# Patient Record
Sex: MALE | Race: WHITE
[De-identification: names, ages, dates, MRNs, and addresses within clinical notes are randomized per-mention and may not be internally consistent; named-entity substitution may affect disease eponyms.]

---

## 2019-08-05 ENCOUNTER — HOSPITAL ENCOUNTER (EMERGENCY)
Dept: HOSPITAL 47 - EC | Age: 24
Discharge: HOME | End: 2019-08-05
Payer: COMMERCIAL

## 2019-08-05 VITALS
TEMPERATURE: 98.4 F | SYSTOLIC BLOOD PRESSURE: 123 MMHG | DIASTOLIC BLOOD PRESSURE: 83 MMHG | RESPIRATION RATE: 16 BRPM | HEART RATE: 84 BPM

## 2019-08-05 DIAGNOSIS — Z88.0: ICD-10-CM

## 2019-08-05 DIAGNOSIS — S90.811A: Primary | ICD-10-CM
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
## 2019-08-05 DIAGNOSIS — Z23: ICD-10-CM

## 2019-08-05 PROCEDURE — 99283 EMERGENCY DEPT VISIT LOW MDM: CPT

## 2019-08-05 PROCEDURE — 90715 TDAP VACCINE 7 YRS/> IM: CPT

## 2019-08-05 PROCEDURE — 90471 IMMUNIZATION ADMIN: CPT

## 2019-08-05 NOTE — ED
General Adult HPI





- General


Chief complaint: Wound/Laceration


Stated complaint: Stepped on nail


Time Seen by Provider: 08/05/19 14:03


Source: patient


Mode of arrival: ambulatory


Limitations: no limitations





- History of Present Illness


Initial comments: 





Patient is a 24-year-old male presenting to emergency Department with chief 

complaint of wanting to get a tetanus shot.  Patient reports he had a minor 

abrasion on the lateral aspect of his right foot with a resting male.  Patient 

is unaware of his tetanus status.  Patient denies any puncture wounds and 

reports the patient is only superficial.  Patient states the needle did not go 

through his shoe.  Patient denies any pain, active bleeding.  Patient is not on 

blood thinners.





- Related Data


                                    Allergies











Allergy/AdvReac Type Severity Reaction Status Date / Time


 


amoxicillin Allergy  Rash/Hives Verified 08/05/19 13:39














Review of Systems


ROS Statement: 


Those systems with pertinent positive or pertinent negative responses have been 

documented in the HPI.





ROS Other: All systems not noted in ROS Statement are negative.





Past Medical History


Additional Past Medical History / Comment(s): spherocytosis


History of Any Multi-Drug Resistant Organisms: None Reported


Additional Past Surgical History / Comment(s): spleenectomy


Past Psychological History: No Psychological Hx Reported


Smoking Status: Never smoker


Past Alcohol Use History: None Reported


Past Drug Use History: None Reported





General Exam





- General Exam Comments


Initial Comments: 





General: Well-developed well-nourished distress


HEENT: Normocephalic/atraumatic, PERLL, pharynx erythema, swallowing well, EAC 

no erythema, no exudates, TM clear, no cervical lymph nodes


Neck: Supple, nontender, trachea midline


Chest/Lungs: Normal respirations, no signs of respiratory distress clear to 

auscultation bilaterally no wheezes, rales, rhonchi


Cardiac: Regular rate and rhythm, normal S1-S2, no murmurs rubs or gallops 


Abdomen/GI: Soft nontender, bowel sounds equal or quadrant x4, no guarding, no 

rebound no CVA tenderness


Musculoskeletal: Nontender, full range of motion, no edema, strength equal 

bilaterally


Skin: Warmth, no rashes or lesions, no cyanosis or diaphoresis


Neurologic: AAO x 3, CN 2-12 intact, 


Psychiatric: Mood and affect normal, judgment normal





Limitations: no limitations





Course


                                   Vital Signs











  08/05/19





  13:35


 


Temperature 98.4 F


 


Pulse Rate 84


 


Respiratory 16





Rate 


 


Blood Pressure 123/83


 


O2 Sat by Pulse 97





Oximetry 














Medical Decision Making





- Medical Decision Making





Patient is a 24-year-old male presenting to emergency Department with a chief 

complaint wanted to get a tetanus shot.  The needle did not go through his shoe 

and the abrasion site is very superficial.  Tetanus shot given.  Patient asked 

to follow-up with primary care.  Strict return parameters were thoroughly 

discussed the patient is understanding and agreeable.  Case discussed with 

physician.





Disposition


Clinical Impression: 


 Abrasion, Abrasion of foot or toe, right





Disposition: HOME SELF-CARE


Condition: Stable


Instructions (If sedation given, give patient instructions):  Abrasion (ED)


Additional Instructions: 


Please follow with primary care.  Please return to emergency department if 

symptoms worsen.


Is patient prescribed a controlled substance at d/c from ED?: No


Referrals: 


None,Stated [Primary Care Provider] - 1-2 days


Time of Disposition: 14:18